# Patient Record
(demographics unavailable — no encounter records)

---

## 2018-02-26 NOTE — OPERATIVE REPORT
Operative Report





- General


Procedure Date: 02/26/18


Planned Procedure: Laparoscopic cholecytectomy, possible open cholecystectomy, 

possible intrao


Pre-Op Diagnosis: Unremitting biliary colic - early cholecystitis


Procedure Performed: 


Laparoscopic cholecystitis


Post Op Diagnosis: Same





- Procedure Note


Primary Surgeon: David العلي MD


Anesthesia Provider: David Bauman MD


Anesthesia Technique: General ET tube


IV Fluids (mL): 600


Estimated Blood Loss (mL): 20


Complications: 


None.





- Other


Other Information/Narrative: 


OPERATIVE DESCRIPTION/REPORT:





After verbal and written informed consent was obtained detailing the risks of 

infection, bleeding with all of its risks including transfusion, common bile 

duct injury, and death the patient was brought to the operative suite and 

placed in the supine position on the operating room table.  Monitoring devices 

were applied along with TEDs and pneumatic compressive stockings.  Care was 

taken to avoid pressure points.  Prophylactic antibiotics were given.  An 

adequate level of general endotracheal anesthesia was established by Dr. David Bauman. The abdomen was then prepped with ChloraPrep and draped in a sterile 

fashion.  A "time in" then confirmed that the patient was identified with 3 

identifiers (name, birth date and medical record number), the history and 

physical was in the chart, the signed consent confirming the procedure was in 

the chart, the patient was in the correct position, the aforementioned 

prophylactic measures were in place or given, we had the correct personnel and 

equipment to complete the procedure and that anesthesia, surgery and nursing 

were given an opportunity to express any concerns.  





The initial incision was at the umbilicus and dissection to the linea alba was 

completed using blunt dissection.  The linea alba was grasped with a Kocher and 

incised.  In a similar manner the peritoneum was grasped and incised using 

Metzenbaum scissors.  In this location, a 12 mm blunt tipped, balloon tipped 

port was placed and the balloon was inflated to keep the port in position.  The 

abdominal cavity was insufflated with carbon dioxide to steady-state pressure 

of 15 mmHg.  Three additional 5 mm ports were placed in standard location for 

laparoscopic cholecystectomy (subxiphoid and 2 right subcostal) under direct 

vision of the 30 degree laparoscope and without incident.   The patient was 

then placed in reverse Trendelenburg position and was rotated slightly to their 

left. 





The gallbladder fundus was grasped with an atraumatic grasper.  Multiple 

adhesions had to be taken down by blunt and sharp dissection along with 

electrocautery.  Eventually, we identified the infundibulum, and this was then 

grasped and retracted inferior and laterally.  Dissection was then begun in the 

angle of Calot.  The cystic duct and (slightly medially and posteriorly) cystic 

artery were clearly identified.  The critical view was obtained.  Two clips 

proximally and one clip distally were used to control both the cystic duct and 

cystic artery.  The clips were carefully placed to avoid occluding the juncture 

with the common bile duct.  Both the cystic duct and then the cystic artery 

were then transected with laparoscopic joselyn.  The gallbladder was then 

removed from its fossa in a retrograde fashion using electrocautery.  With the 

30 degree 5 mm scope in the subxiphoid position, the gallbladder was placed in 

an EndoCatch bag to be extracted through the 12 mm port site.   I irrigated the 

right upper quadrant with a liter of warm sterile saline, and (after a delay 

due to temporary unavailability of suction in the room) the area was aspirated 

dry.  I inspected the gallbladder fossa and there was no bleeding or bile leak.

  Clips on the cystic duct and cystic artery appeared to be secure.   I briefly 

visually explored the abdomen.  There was no other evidence of overt pathology.

  I injected the port sites at the peritoneal, fascial, and skin levels under 

direct vision with 0.25% Marcaine (60 Ml as 0.5% Marcaine ws not available).   

All ports and the EndoCatch containing the gallbladder were removed.  Following 

gallbladder removal, the remaining carbon dioxide was expelled from the abdomen.





The fascia at the umbilicus was reapproximated using a 0 Vicryl suture.  The 

skin at each port site was approximated using a subcuticular 4-0 Monocryl.  The 

surgical count of instruments, needles and sponges was reported as correct 

twice.  Mastisol, Steri-Strips and sterile surgical dressings were applied.  

The patient was then awakened from anesthesia, extubated, and having tolerated 

the procedure well, was transported to the recovery room.  No complications 

were encountered.  A "time out" confirmed the operation performed, the fluids 

given, the estimated blood loss and anesthesia, surgery and nursing were given 

an opportunity to express any concerns.

## 2018-02-26 NOTE — CONSULTATION NOTE
Referring Provider


Name of Referring Provider:: David Larsen MD


Consult Date: 02/26/18





Chief Complaint





- Chief Complaint


Chief Complaint: Postprandial RUQ pain with nausea and vomiting





History of Present Illness





- Admitted From


Admitted From:: ED





- History Obtained From


Records Reviewed: Yes


History obtained from: Patient


Exam Limitations: The examination was done in the montiel in ED.





- History of Present Illness


HPI Comment/Other: 


This very pleasant 21-year-old female had the abrupt onset of epigastric and 

right upper quadrant pain last night after eating a meal consisting of 

quesadillas and nodules.  There was some alcohol that was consumed as well.  

The patient is postpartum.  There is no hematemesis melena or hematochezia.  

The pain was described as right-sided sharp up underneath her right ribs.  She 

had not had this pain previously.  She does complain of some occasional GERD.





History





- Past Medical History


GI: reports: GERD


MRSA Hx?: No





- POLST


Patient has POLST: No





Meds/Allgy





- Home Medications


Home Medications: 


 Ambulatory Orders











 Medication  Instructions  Recorded  Confirmed


 


Pnv No.122/Iron/Folic Acid 1 tab PO DAILY 02/26/18 02/26/18





[Prenatal Multi Tablet]   














- Allergies


Allergies/Adverse Reactions: 


 Allergies











Allergy/AdvReac Type Severity Reaction Status Date / Time


 


No Known Drug Allergies Allergy   Verified 12/27/15 17:12














Review of Systems





- Constitutional


Constitutional: denies: Fatigue, Fever, Chills, Malaise





- Eyes


Eyes: denies: Pain





- Ears, Nose & Throat


Ears, Nose & Throat: denies: Ear pain





- Cardiovascular


Cariovascular: denies: Irregular heart rate, Palpitations, Chest pain





- Respiratory


Respiratory: denies: Cough





- Gastrointestinal


Gastrointestinal: reports: Abdominal pain, Nausea, Vomiting.  denies: Black 

stools, Bloody stools





- Genitourinary


Genitourinary: denies: Dysuria





- Integumentary


Integumentary: denies: Rash





- Neurological


Neurological: denies: General weakness, Focal weakness





- Psychiatric


Psychiatric: denies: Depression





Exam





- Vital Signs


Reviewed Vital Signs: Yes


Vital Signs: 





 Vital Signs x48h











  Pulse Resp BP Pulse Ox


 


 02/26/18 14:50  74  14  106/72  100


 


 02/26/18 13:59  74  16  101/67  100


 


 02/26/18 12:30  68  16  100/64  97


 


 02/26/18 09:24  56 L  18  149/101 H  100














- Physical Exam


General Appearance: positive: No acute distress


Eyes Bilateral: positive: Normal inspection


ENT: positive: No signs of dehydration, Dry mucous membranes


Neck: positive: Trachea midline


Respiratory: positive: Chest non-tender, No respiratory distress, Breath sounds 

nml


Cardiovascular: positive: Regular rate & rhythm


Abdomen: positive: Nml bowel sounds, Tenderness (In RUQ.)


Skin: positive: Color nml


Neurologic/Psychiatric: positive: Oriented x3, Mood/affect nml





Conclusion/Plan





- Plan


Plan: 


Laparoscopic cholecystectomy, possible open cholecystectomy, possible 

intraoperative cholangiogram, possible common bile duct exploration.  The 

indications, procedure, alternatives including no surgery, ingestion of Actigall

, possible risks including infection (deep or superficial), bleeding requiring 

transfusion (with all of its risks), common bile duct injury requring repair 

and additional surgery, and death were fully explained to the patient and all 

questions answered.  I also explained the pathophysiology.  I explained that 

following the surgery I did not want her lifting anything over 15 pounds for 6 

weeks to allow for optimal healing and to decrease the likelihood that a hernia 

would occur.  All questions were fully answered.  Verbal and written consent 

was obtained.  The patient, in preparation for surgery will be nothing by mouth

, and receive 2 gm of Cephalexin with induction.  I asked her to contact me 

with any surgical questions and her concerns and she stated that she would.  I 

asked her to let me know if there is any way we can make her stay at 

[facility] more comfortable and she stated that she would let 

me know.





The plan is to do this operation as an outpatient procedure and to discharge 

her home following the procedure.





45 minutes of face-to-face time spent with the patient, over 80% in discussion 

and coordination of her care





- Lab Results


Lab results reviewed: Yes


Fish Bones: 


 02/26/18 08:16





 02/26/18 08:16





- Diagnostic Imaging Results


Diagnostic Imaging Results: positive: Final report reviewed

## 2018-02-26 NOTE — MRI REPORT
EXAM:

MR ABDOMEN WITHOUT CONTRAST

(MR CHOLANGIOPANCREATOGRAPHY)

 

EXAM DATE: 2/26/2018 11:57 AM.

 

CLINICAL HISTORY: Cholecystitis, elevated bili, enlarged CD.

 

COMPARISON: Right upper quadrant abdominal ultrasound earlier same day.

 

TECHNIQUE: Multiplanar breath-hold T1 and T2 sequences obtained through the abdomen on an MR scanner.
 Dedicated 2D and 3D MRCP sequences obtained through the biliary and pancreatic ducts. No intravenous
 contrast given.

 

FINDINGS:

Lung Bases: The lung bases are clear.

 

Liver: The liver has normal size, morphology and signal. No evidence of mass. The intrahepatic bile d
ucts appear normal.

 

CBD: The extrahepatic ducts appear normal. The CBD is 7 mm in diameter. 10 mm common hepatic duct tiara
meter.

 

Gallbladder: Distended gallbladder contains multiple small dependent stones. No significant wall thic
kening.

 

Pancreas: The pancreas appears normal with no mass. The pancreatic duct measures 1 mm in diameter and
 appears normal with no stone or stricture.

 

Spleen: The spleen appears normal.

 

Kidneys and Adrenals: The kidneys appear normal with no mass or hydronephrosis. The adrenals appear n
ormal.

 

Bowel: The small bowel and colon appear normal with no inflammation or obstruction.

 

Retroperitoneum: The retroperitoneal structures appear normal with no mass or lymphadenopathy.

 

IMPRESSION: 

1. No choledocholithiasis.

2. Mildly prominent 10 mm common hepatic duct diameter.

3. Distended gallbladder contains multiple small dependent stones. Positive sonographic Pavon's sign
 on ultrasound earlier today raises concern for acute cholecystitis.

 

[facility]

Referring Provider Line: [phone]

 

SITE ID: [id number]

## 2018-02-26 NOTE — ED PHYSICIAN DOCUMENTATION
PD HPI ABD PAIN





- Stated complaint


Stated Complaint: ABD PX





- Chief complaint


Chief Complaint: Abd Pain





- History obtained from


History obtained from: Patient





- History of Present Illness


Timing - onset: Enter  time (2200), Last night


Timing - duration: Hours


Timing - details: Gradual onset, Still present


Quality: Sharp, Pain


Location: Epigastric


Improved by: Laying still


Worsened by: Moving, Breathing, Position, Palpation


Associated symptoms: Nausea, Vomiting


Similar symptoms before: Has not had sx before


Recently seen: Not recently seen





- Additional information


Additional information: 





21-year-old female has developed acute epigastric abdominal pain at about 11 PM 

last night this is kept her awake all night she has had some vomiting she has 

severe epigastric pain.She denies any use of ibuprofen or Aleve she has had 

some alcohol yesterday with her food and she remembers last thing she ate was a 

quesadilla and some nachos. 





Review of Systems


Constitutional: denies: Fever


Eyes: denies: Decreased vision


Ears: denies: Ear pain


Nose: denies: Congestion


Throat: denies: Sore throat


Cardiac: denies: Chest pain / pressure, Palpitations


Respiratory: denies: Dyspnea, Cough


GI: reports: Abdominal Pain, Nausea, Vomiting


: denies: Dysuria, Frequency


Skin: denies: Rash


Musculoskeletal: denies: Neck pain, Back pain, Extremity pain


Neurologic: denies: Generalized weakness, Focal weakness, Numbness





PD PAST MEDICAL HISTORY





- Past Medical History


GI: GERD





- Past Surgical History


Past Surgical History: Yes





- Present Medications


Home Medications: 


 Ambulatory Orders











 Medication  Instructions  Recorded  Confirmed


 


Pnv No.122/Iron/Folic Acid 1 tab PO DAILY 02/26/18 02/26/18





[Prenatal Multi Tablet]   














- Allergies


Allergies/Adverse Reactions: 


 Allergies











Allergy/AdvReac Type Severity Reaction Status Date / Time


 


No Known Drug Allergies Allergy   Verified 12/27/15 17:12














- Social History


Does the pt smoke?: No


Smoking Status: Never smoker


Does the pt drink ETOH?: Yes


Does the pt have substance abuse?: No





- Immunizations


Immunizations are current?: Yes





- POLST


Patient has POLST: No





PD ED PE NORMAL





- Vitals


Vital signs reviewed: Yes (Mild diastolic hypertension)





- General


General: Alert and oriented X 3, Well developed/nourished, Other (21-year-old 

female moaning in pain)





- HEENT


HEENT: Atraumatic, PERRL, EOMI





- Neck


Neck: Supple, no meningeal sign, No bony TTP





- Cardiac


Cardiac: RRR, No murmur





- Respiratory


Respiratory: No respiratory distress, Clear bilaterally





- Abdomen


Abdomen: Soft, Other (Specific right upper quadrant tenderness to palpation 

with the rest of respiration`)





- Back


Back: No CVA TTP, No spinal TTP





- Derm


Derm: Normal color, Warm and dry, No rash





- Extremities


Extremities: No deformity, No edema





- Neuro


Neuro: Alert and oriented X 3, No motor deficit, No sensory deficit, Normal 

speech


Eye Opening: Spontaneous


Motor: Obeys Commands


Verbal: Oriented


GCS Score: 15





- Psych


Psych: Normal mood, Normal affect





Results





- Vitals


Vitals: 


 Vital Signs - 24 hr











  02/26/18 02/26/18 02/26/18





  07:52 09:24 12:30


 


Temperature 36.4 C L  


 


Heart Rate 66 56 L 68


 


Respiratory 20 18 16





Rate   


 


Blood Pressure 135/82 H 149/101 H 100/64


 


O2 Saturation 100 100 97














  02/26/18





  13:59


 


Temperature 


 


Heart Rate 74


 


Respiratory 16





Rate 


 


Blood Pressure 101/67


 


O2 Saturation 100








 Oxygen











O2 Source                      Room air

















- Labs


Labs: 


 Laboratory Tests











  02/26/18 02/26/18 02/26/18





  08:16 08:16 10:30


 


WBC  10.5  


 


RBC  5.76 H  


 


Hgb  15.5  


 


Hct  46.2  


 


MCV  80.2 L  


 


MCH  27.0  


 


MCHC  33.6  


 


RDW  14.8  


 


Plt Count  250  


 


MPV  8.2  


 


Neut #  8.2 H  


 


Lymph #  1.7  


 


Mono #  0.5  


 


Eos #  0.0  


 


Baso #  0.0  


 


Absolute Nucleated RBC  0.01  


 


Nucleated RBC %  0.0  


 


Sodium   137 


 


Potassium   3.5 


 


Chloride   105 


 


Carbon Dioxide   22 


 


Anion Gap   10.0 


 


BUN   13 


 


Creatinine   0.6 


 


Estimated GFR (MDRD)   126 


 


Glucose   140 H 


 


Calcium   9.2 


 


Total Bilirubin   1.6 H 


 


AST   326 H 


 


ALT   195 H 


 


Alkaline Phosphatase   131 H 


 


Total Protein   8.5 H 


 


Albumin   4.4 


 


Globulin   4.1 


 


Albumin/Globulin Ratio   1.1 


 


Lipase   14 L 


 


Urine Color    DARK YELLOW


 


Urine Clarity    CLEAR


 


Urine pH    7.0


 


Ur Specific Gravity    1.025


 


Urine Protein    TRACE


 


Urine Glucose (UA)    NEGATIVE


 


Urine Ketones    NEGATIVE


 


Urine Occult Blood    NEGATIVE


 


Urine Nitrite    NEGATIVE


 


Urine Bilirubin    NEGATIVE


 


Urine Urobilinogen    0.2 (NORMAL)


 


Ur Leukocyte Esterase    NEGATIVE


 


Ur Microscopic Review    NOT INDICATED


 


Urine Culture Comments    NOT INDICATED


 


Urine HCG, Qual    NEGATIVE














- Rads (name of study)


  ** ultrasound lmt


Radiology: Prelim report reviewed (Impression: Positive stones and localized 

tenderness consistent with calculus cholecystitis.  Common bile duct dilated 11 

mm.), EMP read indepedently, See rad report





  ** MRCP


Radiology: Prelim report reviewed (Impression: 1.  No choledocholithiasis.  

Mildly prominent 10 mm common hepatic duct diameter 2. distended gallbladder 

contains multiple small dependent stones 3.positive sonographic Pavon sign on 

ultrasound earlier today raises concern for acute cholecystitis.), EMP read 

indepedently, See rad report





Procedures





- Bedside sono


Bedside sono by EMP: 





With use of bedside ultrasound the gallbladder is imaged it is distended there 

is a stone present in the fundus of the gallbladder I am not able to adequately 

image the neck of the gallbladder the gallbladder wall is not thickened and 

there is no pericholecystic fluid.





- IVC sono (time)


  ** 0830


Bedside IVC sono: IVC measures (cm) (1.45), IVC collapsed c insp (cm) (complete)

, Dehydration (est 500ml deficit)





PD MEDICAL DECISION MAKING





- ED course


Complexity details: reviewed old records, reviewed results, re-evaluated patient

, considered differential, d/w patient, d/w family


ED course: 





21-year-old female with acute cholelithiasis and cholecystitis has a generous 

common bile duct and on MRCP no evidence of retained stone.  She has 

improvement in her pain with intravenous Toradol but continues to have pain.  

The surgeon Dr. Hargrove is consulted in the case





Departure





- Departure


Disposition: ED Transfer to [facility]


Clinical Impression: 


 Cholecystitis





Cholelithiasis


Qualifiers:


 Cholelithiasis location: gallbladder Cholecystitis presence: with 

cholecystitis Cholecystitis acuity: acute Biliary obstruction: without biliary 

obstruction Qualified Code(s): K80.00 - Calculus of gallbladder with acute 

cholecystitis without obstruction

## 2018-02-26 NOTE — ULTRASOUND REPORT
RIGHT UPPER QUADRANT ULTRASOUND:  02/26/2018

 

CLINICAL INDICATION:  Right upper quadrant pain.

 

TECHNIQUE:  Real-time scanning was performed with representative static images obtained.

 

FINDINGS:  The liver measures 18.9 cm.  Hepatic echogenicity is normal.  No intrahepatic 

biliary dilatation or focal parenchymal lesion is appreciated.  The common bile duct is 

dilated, measuring 11 mm.  The gallbladder demonstrates multiple mobile stones and localized 

tenderness, compatible with acute cholecystitis.  The right kidney measures 13 cm, and 

demonstrates no hydronephrosis.  No free fluid is present.

 

IMPRESSION:  CHOLELITHIASIS, WITH LOCALIZED TENDERNESS AND BILIARY DILATATION, COMPATIBLE WITH 

ACUTE CHOLECYSTITIS AND POSSIBLE CHOLEDOCHOLITHIASIS.

 

 

DD: 02/26/2018 09:16

TD: 02/26/2018 09:33

Job #: [id number]
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
## 2018-09-17 NOTE — ED PHYSICIAN DOCUMENTATION
PD HPI FOCAL NEURO





- Stated complaint


Stated Complaint: BLOOD SUGAR





- Chief complaint


Chief Complaint: Neuro





- History obtained from


History obtained from: Patient





- History of Present Illness


Timing - onset: Today (She was at work today around 4:00 and developed brief 

tunnel vision followed by spinning dizziness.  She does have some left ear 

fullness and chronically decreased hearing.  No acute respiratory complaints 

otherwise.  Still feeling a bit dizzy but generally better.  She was worried 

specifically about low blood sugar but was checked in triage and normal.)





Review of Systems


Constitutional: denies: Fever, Chills


Ears: reports: Loss of hearing, Tinnitus/ringing.  denies: Ear pain, 

Drainage/discharge


Nose: denies: Rhinorrhea / runny nose, Congestion


Throat: denies: Sore throat





PD PAST MEDICAL HISTORY





- Past Medical History


GI: GERD





- Past Surgical History


Past Surgical History: Yes





- Present Medications


Home Medications: 


                                Ambulatory Orders











 Medication  Instructions  Recorded  Confirmed


 


Meclizine [Antivert] 25 mg PO Q6H PRN #15 tablet 09/17/18 


 


Nitrofurantoin Monohyd/M-Cryst 100 mg PO BID #10 capsule 09/17/18 





[Macrobid 100 mg Capsule]   














- Allergies


Allergies/Adverse Reactions: 


                                    Allergies











Allergy/AdvReac Type Severity Reaction Status Date / Time


 


Latex, Natural Rubber Allergy  Rash Verified 09/17/18 18:26














- Social History


Does the pt smoke?: No


Smoking Status: Never smoker


Does the pt drink ETOH?: Yes


Does the pt have substance abuse?: No





- Immunizations


Immunizations are current?: Yes





- POLST


Patient has POLST: No





PD ED PE NORMAL





- Vitals


Vital signs reviewed: Yes





- General


General: Alert and oriented X 3, No acute distress





- HEENT


HEENT: PERRL, EOMI, Ears normal, Pharynx benign





- Neck


Neck: Supple, no meningeal sign, No bony TTP





- Cardiac


Cardiac: RRR, No murmur





- Respiratory


Respiratory: No respiratory distress, Clear bilaterally





- Abdomen


Abdomen: Non tender





- Neuro


Neuro: Alert and oriented X 3, CNs 2-12 intact


Eye Opening: Spontaneous


Motor: Obeys Commands


Verbal: Oriented


GCS Score: 15





- Psych


Psych: Normal mood, Normal affect





Results





- Vitals


Vitals: 


                               Vital Signs - 24 hr











  09/17/18





  18:07


 


Temperature 37 C


 


Heart Rate 77


 


Respiratory 16





Rate 


 


Blood Pressure 109/71


 


O2 Saturation 100








                                     Oxygen











O2 Source                      Room air

















- Labs


Labs: 


                                Laboratory Tests











  09/17/18 09/17/18





  18:07 18:23


 


POC Whole Bld Glucose   91


 


Urine Color  YELLOW 


 


Urine Clarity  CLOUDY 


 


Urine pH  6.0 


 


Ur Specific Gravity  1.015 


 


Urine Protein  NEGATIVE 


 


Urine Glucose (UA)  NEGATIVE 


 


Urine Ketones  NEGATIVE 


 


Urine Occult Blood  NEGATIVE 


 


Urine Nitrite  NEGATIVE 


 


Urine Bilirubin  NEGATIVE 


 


Urine Urobilinogen  0.2 (NORMAL) 


 


Ur Leukocyte Esterase  SMALL H 


 


Urine RBC  0-5 


 


Urine WBC  11-25 H 


 


Ur Squamous Epith Cells  MANY Squamous H 


 


Urine Bacteria  Many H 


 


Ur Microscopic Review  INDICATED 


 


Urine Culture Comments  NOT INDICATED 


 


Urine HCG, Qual  NEGATIVE 














PD MEDICAL DECISION MAKING





- ED course


ED course: 





She has nonspecific dizziness which is likely mostly vertiginous based on 

history but no physical findings and a negative stroke scale.





- Sepsis Event


Vital Signs: 


                               Vital Signs - 24 hr











  09/17/18





  18:07


 


Temperature 37 C


 


Heart Rate 77


 


Respiratory 16





Rate 


 


Blood Pressure 109/71


 


O2 Saturation 100








                                     Oxygen











O2 Source                      Room air

















Departure





- Departure


Disposition: 01 Home, Self Care


Clinical Impression: 


 Cystitis, Dizziness





Condition: Good


Record reviewed to determine appropriate education?: Yes


Instructions:  ED UTI Cystitis Female, ED Dizziness [facility]


Prescriptions: 


Meclizine [Antivert] 25 mg PO Q6H PRN #15 tablet


 PRN Reason: Vertigo


Nitrofurantoin Monohyd/M-Cryst [Macrobid 100 mg Capsule] 100 mg PO BID #10 

capsule


Comments: 


Call your doctor to arrange a follow-up appointment, make the next available 

appointment.  In the interim, return anytime if worse or if new symptoms 

develop.

## 2018-11-22 NOTE — ED PHYSICIAN DOCUMENTATION
PD HPI NVD





- Stated complaint


Stated Complaint: VOMITING





- Chief complaint


Chief Complaint: Abd Pain





- History obtained from


History obtained from: Patient





- History of Present Illness


Timing - onset: How many weeks ago (1.5)


Timing - duration: Weeks (1.5)


Timing - details: Gradual onset, Intermittant


Pain level max: 3


Pain level now: 0


Associated symptoms: Abdominal pain.  No: Chest pain, Hematemesis, Melena, 

Hematochezia, Dizzy, Near syncope / syncope, Loss of appetite, Dysuria, 

Hematuria, Vaginal bleeding, Vaginal dc


Contributing factors: No: Sick contact, Bad food, Travel, Recent antibiotics


Improved by: Other (Nothing)


Worsened by: Eating


Similar symptoms before: Has not had sx before


Recently seen: Not recently seen





- Additonal information


Additional information: 





22-year-old female  history of cholecystectomy here with complaint of nausea

And vomiting the past 1-1/2 weeks.  Patient stated she usually vomits every time

she eats at least 3 times a day. States she has low abdominal cramping just 

before she vomits. She also stated she has intermittent chronic diarrhea since 

her cholecystectomy in 2018.  Denies fever but has chills.  Patient 

denies any trauma, travel or sick contact.Denies pregnancy her LMP was  which was regular.  She is .





Review of Systems


Ten Systems: 10 systems reviewed and negative


Constitutional: reports: Chills.  denies: Fever, Myalgias


Nose: denies: Congestion


Throat: denies: Sore throat


Cardiac: denies: Chest pain / pressure


Respiratory: denies: Dyspnea, Cough


GI: reports: Abdominal Pain, Nausea, Vomiting, Diarrhea.  denies: Constipation, 

Hematemesis, Bloody / black stool


: denies: Dysuria, Frequency, Discharge


Neurologic: denies: Generalized weakness





PD PAST MEDICAL HISTORY





- Past Medical History


GI: GERD





- Past Surgical History


Past Surgical History: Yes


General: Cholecystectomy





- Present Medications


Home Medications: 


                                Ambulatory Orders











 Medication  Instructions  Recorded  Confirmed


 


Meclizine [Antivert] 25 mg PO Q6H PRN #15 tablet 18 


 


Nitrofurantoin Monohyd/M-Cryst 100 mg PO BID #10 capsule 18 





[Macrobid 100 mg Capsule]   














- Allergies


Allergies/Adverse Reactions: 


                                    Allergies











Allergy/AdvReac Type Severity Reaction Status Date / Time


 


Latex, Natural Rubber Allergy  Rash Verified 18 19:58














- Social History


Does the pt smoke?: No


Smoking Status: Never smoker


Does the pt drink ETOH?: Yes


Does the pt have substance abuse?: No





- Immunizations


Immunizations are current?: Yes





- POLST


Patient has POLST: No





PD ED PE NORMAL





- Vitals


Vital signs reviewed: Yes





- General


General: Alert and oriented X 3, No acute distress, Well developed/nourished





- HEENT


HEENT: EOMI, Moist mucous membranes, Pharynx benign





- Neck


Neck: Supple, no meningeal sign





- Cardiac


Cardiac: RRR, No murmur





- Respiratory


Respiratory: No respiratory distress, Clear bilaterally





- Abdomen


Abdomen: Normal bowel sounds, Soft, Non distended, Other (Mild lower abdominal 

pain and left lower quadrant pain To deep palpation without guarding, rebound no

rigidity.)





- Back


Back: No CVA TTP





- Derm


Derm: Normal color, Warm and dry





- Extremities


Extremities: No deformity





- Neuro


Neuro: Alert and oriented X 3





- Psych


Psych: Normal mood, Normal affect





Results





- Vitals


Vitals: 


                               Vital Signs - 24 hr











  18





  19:59 20:15


 


Temperature 36.9 C 


 


Heart Rate 89 98


 


Respiratory 18 16





Rate  


 


Blood Pressure 125/75 121/84 H


 


O2 Saturation 100 100








                                     Oxygen











O2 Source                      Room air

















- Labs


Labs: 


                                Laboratory Tests











  18





  20:33 20:33 20:33


 


WBC  12.0 H  


 


RBC  5.05  


 


Hgb  13.5  


 


Hct  41.1  


 


MCV  81.5  


 


MCH  26.8 L  


 


MCHC  32.9  


 


RDW  14.9  


 


Plt Count  298  


 


MPV  8.0  


 


Neut # (Auto)  7.0 H  


 


Lymph # (Auto)  3.7 H  


 


Mono # (Auto)  1.0  


 


Eos # (Auto)  0.2  


 


Baso # (Auto)  0.0  


 


Absolute Nucleated RBC  0.01  


 


Nucleated RBC %  0.0  


 


Sodium   139 


 


Potassium   3.3 L 


 


Chloride   106 


 


Carbon Dioxide   24 


 


Anion Gap   9.0 


 


BUN   11 


 


Creatinine   0.6 


 


Estimated GFR (MDRD)   125 


 


Glucose   97 


 


Calcium   9.0 


 


Total Bilirubin   0.4 


 


AST   27 


 


ALT   28 


 


Alkaline Phosphatase   87 


 


Total Protein   7.9 


 


Albumin   3.8 


 


Globulin   4.1 


 


Albumin/Globulin Ratio   0.9 L 


 


Lipase   25 


 


HCG, Quant    1198.50


 


Urine Color   


 


Urine Clarity   


 


Urine pH   


 


Ur Specific Gravity   


 


Urine Protein   


 


Urine Glucose (UA)   


 


Urine Ketones   


 


Urine Occult Blood   


 


Urine Nitrite   


 


Urine Bilirubin   


 


Urine Urobilinogen   


 


Ur Leukocyte Esterase   


 


Ur Microscopic Review   


 


Urine Culture Comments   


 


Urine HCG, Qual   














  18





  20:54


 


WBC 


 


RBC 


 


Hgb 


 


Hct 


 


MCV 


 


MCH 


 


MCHC 


 


RDW 


 


Plt Count 


 


MPV 


 


Neut # (Auto) 


 


Lymph # (Auto) 


 


Mono # (Auto) 


 


Eos # (Auto) 


 


Baso # (Auto) 


 


Absolute Nucleated RBC 


 


Nucleated RBC % 


 


Sodium 


 


Potassium 


 


Chloride 


 


Carbon Dioxide 


 


Anion Gap 


 


BUN 


 


Creatinine 


 


Estimated GFR (MDRD) 


 


Glucose 


 


Calcium 


 


Total Bilirubin 


 


AST 


 


ALT 


 


Alkaline Phosphatase 


 


Total Protein 


 


Albumin 


 


Globulin 


 


Albumin/Globulin Ratio 


 


Lipase 


 


HCG, Quant 


 


Urine Color  YELLOW


 


Urine Clarity  CLEAR


 


Urine pH  6.0


 


Ur Specific Gravity  <=1.005


 


Urine Protein  NEGATIVE


 


Urine Glucose (UA)  NEGATIVE


 


Urine Ketones  NEGATIVE


 


Urine Occult Blood  NEGATIVE


 


Urine Nitrite  NEGATIVE


 


Urine Bilirubin  NEGATIVE


 


Urine Urobilinogen  0.2 (NORMAL)


 


Ur Leukocyte Esterase  NEGATIVE


 


Ur Microscopic Review  NOT INDICATED


 


Urine Culture Comments  NOT INDICATED


 


Urine HCG, Qual  POSITIVE














PD MEDICAL DECISION MAKING





- ED course


Complexity details: reviewed results, re-evaluated patient, considered 

differential (Gastroenteritis, obstruction, diverticulitis, UTI, pregnancy, 

colitis), d/w patient


ED course: 





 patient informed that her urine pregnancy was positive.  She confirmed that

her last menstrual cycle was regular and that she was using condoms.  We will do

a quantitative hCG to confirm the pregnancy or not.   patient here with her 

girlfriend in no acute distress.  Informed that her hCG quantitative was 1198 so

she is pregnant.  Patient stated although she is having problems with her 

marriage and her first baby just turned 1 she will see her midwife for prenatal 

care.  Patient does not want any medicationFor nausea because she does not like 

taking medications.  She also stated she had a glass of wine yesterday.But now 

that she is pregnant she is aware that she cannot drink any alcohol.  We 

discussed foods that are high in potassiumFor her blood level of 3.3.





Departure





- Departure


Disposition: 01 Home, Self Care


Clinical Impression: 


 Hypokalemia due to loss of potassium





Vomiting


Qualifiers:


 Vomiting type: unspecified Vomiting Intractability: non-intractable Nausea 

presence: with nausea Qualified Code(s): R11.2 - Nausea with vomiting, 

unspecified





Pregnancy


Qualifiers:


 Weeks of gestation: less than 8 weeks Qualified Code(s): Z3A.01 - Less than 8 

weeks gestation of pregnancy





Condition: Stable


Instructions:  Diet High Potassium Dc, Diet Clear Liquid Dc, ED Prenatal Care, 

ED Preg Established Normal Sxs


Comments: 


Follow-up with your midwife for prenatal care Next week.  Clear liquids today.  

Advance to brat diet (bananas, rice, applesauce, toast or crackers).  Drink and 

eat in small amounts but frequently.  Eat foods that are high in potassium from 

the list that was given to you.  If worse return to the emergency room.

## 2019-09-30 NOTE — ULTRASOUND REPORT
Reason:  POSITIVE PREGNANCY TEST

Procedure Date:  09/29/2019   

Accession Number:  [id number] / [id number]                    

Procedure:  US  - OB First Trimester CPT Code:  

 

FULL RESULT:

 

 

EXAM:

FIRST TRIMESTER OBSTETRIC ULTRASOUND

(Less than 11 weeks)

 

EXAM DATE: 9/29/2019 10:52 AM.

 

CLINICAL HISTORY: Positive pregnancy test.

 

LMP: Unknown.

 

COMPARISONS: None.

 

TECHNIQUE: Transabdominal and transvaginal ultrasound examination with 

static image documentation.

 

CLINICAL DATES:

EGA 7 weeks 3 days with BLANCA 05/14/2020 based on LMP.

 

ASSESSMENT:

Gestational Sac: Single intrauterine.  Mean gestational sac diameter: 38 

mm = 9 weeks 1 day.

Embryo: CRL (crown-rump length) 18 mm = 8 weeks 2 days.

Cardiac activity: 161 beats per minute.

Yolk sac: 4 mm.

Amniotic fluid: Present.

Early placenta: Not visible at this gestational age.

Other: No perigestational fluid collection demonstrated.

 

MATERNAL STRUCTURES:

Uterus: Anteverted. Unremarkable.

Cervix: Closed.

Right Ovary/Adnexa: The ovary measures 3.4 x 1.6 x 1.6 cm, volume 4.5 cc. 

Subcentimeter benign-appearing cyst.

Left Ovary/Adnexa: The ovary measures 3.4 x 1.8 x 2.3 cm, volume 7.4 cc.  

18 mm maximal diameter corpus luteal cyst.

Free Fluid: Trace.

 

Other: None.

IMPRESSION:

1. Single viable intrauterine pregnancy at EGA 8 weeks 2 days with BLANCA 

05/08/2020 based on crown-rump length, which is concordant with clinical 

dates.

2. Assigned dating is BLANCA 05/08/2020 based on current ultrasound.

 

[facility]

## 2019-12-16 NOTE — ULTRASOUND REPORT
Reason:   SCREENING

Procedure Date:  2019   

Accession Number:  [id number] / [id number]                    

Procedure:  US  - OB Detailed Fetal Eval CPT Code:  

 

***Final Report***

 

 

FULL RESULT:

 

 

EXAM:

COMPLETE OBSTETRICAL ULTRASOUND

 

EXAM DATE: 2019 07:24 AM.

 

CLINICAL HISTORY: Fetal anatomic survey.

 

COMPARISON: OB FIRST TRIMESTER 2019 9:48 AM.

 

TECHNIQUE: Real-time sonographic evaluation of the fetus performed by the 

sonographer. Multiple representative static images were saved for review.

 

DATING:

Established EGA 19 weeks 3 days with BLANCA 2020 based on first 

ultrasound of 2019.

 

EGA 19 weeks 2 days with BLANCA 2020 based on the current ultrasound.

 

GENERAL EVALUATION

Sanderson pregnancy.

Cardiac activity: 150 bpm.

Fetal movement: Present

Presentation: Breech

Placenta: Anterior position. No evidence for previa.

Umbilical cord: 3 vessel cord. Eccentric placental cord origin.

Amniotic fluid: Subjectively normal. MVP 5 cm.

 

FETAL BIOMETRY

Bi-Parietal Diameter (BPD): 4.3 cm, 18 weeks 6 days

Head Circumference (HC):   16.6 cm, 19 weeks 2 days

Abdominal Circumference (AC): 14.3 cm, 19 weeks 4 days

Femur Length (FL): 3.1 cm, 19 weeks 4 days

 

Estimated Fetal Weight: 297 g, 51st percentile for 19 weeks 3 days.

 

FETAL ANATOMY

The intracranial structures, profile, spine, stomach, abdominal wall and 

cord insertion, diaphragm, kidneys, bladder, and lower extremities were 

visualized and demonstrate no abnormality. The heart and outflow tracts, 

nose and lips, and real-time open hands are not seen today in follow-up 

imaging in 2-3 weeks is suggested.

 

MATERNAL STRUCTURES

Uterus: Unremarkable.

Cervix: Long and closed. Transabdominal length 4.2 cm.

Right ovary/adnexa: Unremarkable.

Left ovary/adnexa: Unremarkable.

Free fluid: None.

IMPRESSION:

1. Sanderson live intrauterine pregnancy with gestational age 19 weeks 3 

days based on first ultrasound.

2. Estimated fetal weight is within expected limits for assigned dating.

3. Limited fetal anatomic survey.  Follow-up ultrasound in 2-3 weeks for 

assessment of the heart and outflow tracts, nose and lips, and real-time 

open hands is suggested.

 

[facility]

## 2020-01-07 NOTE — ULTRASOUND REPORT
Reason:   SCREENING

Procedure Date:  2020   

Accession Number:  [id number] / [id number]                    

Procedure:  US  - OB F/U or Repeat CPT Code:  

 

***Final Report***

 

 

FULL RESULT:

 

 

EXAM:

FOLLOW-UP OBSTETRICAL ULTRASOUND

 

EXAM DATE: 2020 01:52 PM.

 

CLINICAL HISTORY: Follow-up incomplete survey. 4 chamber, RVOT, LVOT, 

nose lips and open hands

 

COMPARISON: 2019.

 

TECHNIQUE: Real-time sonographic evaluation of the fetus performed by the 

sonographer.  Multiple representative static images were saved for review.

 

DATING:

Established EGA 22 weeks 3 days with BLANCA 2020 based on  assigned 

dating.

 

GENERAL EVALUATION

Sanderson pregnancy.

Cardiac activity: 153 bpm.

Fetal movement: Visualized.

Presentation: Variable

Placenta: Anterior position.

Amniotic fluid: Normal. TARSHA 18.4 cm. MVP 6.2 cm.

 

FETAL ANATOMY

The four-chamber heart, LVOT visualized. RVOT not visualized. Open hands 

visualized in real time. Fetal profile seen. Nose lips not visualized.

 

 

IMPRESSION:

1. Sanderson live intrauterine pregnancy with gestational age 22 weeks 3 

days based on   assigned dating.

2. Follow-up previous incomplete survey shows normal four-chamber heart, 

LVOT, nasal bone, open hands.

3. Anatomy not visualized RVOT, nose lips. Additional follow-up scan

[facility]

## 2020-01-21 NOTE — ULTRASOUND REPORT
Reason:   SCREENING, COMPLETION FAS

Procedure Date:  2020   

Accession Number:  [id number] / [id number]                    

Procedure:  US  - OB F/U or Repeat CPT Code:  

 

***Final Report***

 

 

FULL RESULT:

 

 

EXAM:

FOLLOW-UP OBSTETRICAL ULTRASOUND

 

EXAM DATE: 2020 01:02 PM.

 

CLINICAL HISTORY:  SCREENING, COMPLETION FAS.

 

COMPARISON: OB F/U OR REPEAT 2020 12:19 PM

OB FIRST TRIMESTER 2019 9:48 AM.

 

TECHNIQUE: Real-time sonographic evaluation of the fetus performed by the 

sonographer.   Multiple representative static images were saved for 

review.

 

DATING:

Established EGA 24 weeks 3 days with BLANCA 2020 based on physician 

stated.

EGA 24 weeks 3 days with BLANCA 2020 based on  first ultrasound .

 

 

GENERAL EVALUATION

Sanderson pregnancy.

Cardiac activity: 152 bpm.

Fetal movement: Visualized.

Presentation: Cephalic.

Placenta: Anterior position.

Amniotic fluid: Normal TARSHA 15.5 cm. MVP 4.9 cm.

 

FETAL ANATOMY

Cardiac: 4 chamber heart, LVOT, RVOT visualized and normal. No 

pericardial effusion. Nose lips, profile normal.

 

IMPRESSION:

1. Sanderson live intrauterine pregnancy with gestational age 24 weeks 3 

days based on physician assigned dating.

2. Follow-up shows normal fetal cardiac exam, nose lips, profile.

 

[facility]

## 2020-04-01 NOTE — PROVIDER PROGRESS NOTE
- HPI


Chief Complaint: Pain, non-labor


Current Pregnancy: 


                                                               Current Pregnancy





EDU                              20


Gestation                        34 Weeks and 5 Days


                          5


Para                             1





Vital Signs











Temperature  36.8 C   20 17:27


 


Heart Rate  94   20 17:27


 


Respiratory Rate  17   20 17:27


 


Blood Pressure  107/72   20 17:27


 


O2 Saturation  99   20 17:27




















Temperature  36.8 C   20 17:27


 


Heart Rate  94   20 17:27


 


Respiratory Rate  17   20 17:27


 


Blood Pressure  107/72   20 17:27


 


O2 Saturation  99   20 17:27














- Exam


Pt presents to triage with lower abdominal pain that has been dull for a week, 

but got worse today. She reports infrequent sharp pains. She denies sensation of

contractions, or a "coming and going" of this pain. She does report two weeks of

increased whitish-green smelly discharge consistent with previous BV infections 

in her past. She also reports feeling lightheaded and nauseas that she equates 

to be anemic. Denies HA, LOF, VB. Reports normal FM. 








- Procedures


OB Procedure Performed: NST (BD affirm and CBC collected, FHTs evaluated)


Findings: 


FHTs 140s, moderate variability, accels, no decels


CAT I


No uterine contractions








- Plan


Plan: 


BD affirm and CBC collected. Will telephone patient with abnormal results.


Discharged pt to home with  labor precautions and regular warning signs.

## 2020-05-12 NOTE — DELIVERY NOTE
Delivery Note





- Labor


Labor: positive: Spontaneous





- Infant Delivery Method


Infant Delivery Method: positive: Spontaneous vaginal delivery





- Birth Presentation


Birth Presentation: positive: Vertex, LOBO - left occiput anterior





- Nuchal Cord


Nuchal Cord: positive: None (Foot entanglement)





- Anesthetic


Anesthetic Type: 





- Amniotic Fluid Description


Amniotic Fluid Description: positive: Clear





- Episiotomy Type


Episiotomy Type: positive: None





- Laceration


Laceration: positive: 1st degree, Labial





- Delivery Outcome


Delivery Outcome: positive: Livebirth





- [place]


: positive: Placed in direct skin contact with mother, Bulb syringe, 

Stimulated


 sex: positive: Male (Apgars 9/9, weight 8 lb 12 oz)





- Cord


Cord: positive: 3 vessels





- Placenta


Placenta: positive: Intact, Spontaneous





- Estimated Blood Loss


Estimated Blood Loss (in cc): 175





- Post Delivery Events


Post Delivery Events: positive: No post delivery events





- Delivery Comments (Free Text/Narrative)


Delivery Comments (Free Text/Narrative): 


Patient developed contractions throughout the day.  However roughly 8:00 and 

became very strongly.  She presented to labor and delivery at before midnight.  

She was initially noted to have contractions and then spontaneously ruptured in 

labor and delivery at 0015.  For this reason she was admitted.  During labor she

developed contractions which became stronger with time she had an epidural 

placed for labor analgesia.  Her strip was category 1 however it developed a 

bradycardia down into the 90s.  This resolved with O2 as well as position change

to.  Her accelerations returned.  She reached complete at 0 322 and started to 

push at 0 344 she delivered a live male infant left occiput anterior.  At time 

of delivery there was a pause prior to delivering the anterior shoulder.  

However this responded to Enriqueta maneuvers.  The infant had Apgars of 9 and 9

weight 8 pounds 12 ounces there was a foot entanglement noted at time of 

delivery.  Baby was placed on maternal chest.  The cord was clamped after it 

stopped pulsating and then divided.  Placenta was delivered at 0 349 was 

inspected and noted to be complete.  She had minimal blood loss during her 

delivery and 175 cc a.  Inspecting the perineum there is no evidence of any 

lacerations there were some small periurethral or los-labial minora lacerations

which did not warrant sutures.  Both mother and infant tolerated delivery well.

## 2020-05-12 NOTE — ANESTHESIA
Pre-Anesthesia VS, & Labs





- Diagnosis





labor





- Procedure





labor epidural


Vital Signs: 





                                        











Temp Pulse Resp BP Pulse Ox


 


 36.7 C   87   18   137/77 H  97 


 


 05/12/20 01:00  05/11/20 23:17  05/11/20 23:17  05/11/20 23:27  05/11/20 23:17














                                        





Height                           5 ft 5 in


Weight (kg)                      108.862 kg


Body Mass Index                  36.5











- Pregnancy


Is Patient Pregnant?: Yes





- Lab Results


Current Lab Results: 





Laboratory Tests





05/12/20 00:44: WBC 16.8 H, RBC 4.74, Hgb 13.0, Hct 40.3, MCV 85.0, MCH 27.4, 

MCHC 32.3, RDW 15.9 H, Plt Count 257, MPV 10.8, Neut # (Auto) 11.9 H, Lymph # 

(Auto) 3.4, Mono # (Auto) 1.1 H, Eos # (Auto) 0.2, Baso # (Auto) 0.0, Absolute 

Nucleated RBC 0.00, Nucleated RBC % 0.0








Fish Bones: 


                                 05/12/20 00:44








Home Medications and Allergies





Active Medications





Ampicillin Sodium 1 gm/ Sodium (Chloride)  100 mls @ 200 mls/hr IV Q4H [facility]


Lactated Ringer's (Lr)  1,000 mls @ 150 mls/hr IV .[id number] [facility]


   Last Admin: 05/12/20 01:13 Dose:  150 mls/hr


Sodium Chloride (Normal Saline Flush 0.9%)  10 ml IVP PRN PRN


   PRN Reason: AS NEEDED PER PROVIDER ORDERS


Sodium Chloride (Normal Saline Flush 0.9%)  10 ml IVP 0100,0900,1700 [facility]


   Last Admin: 05/12/20 01:13 Dose:  10 ml








Allergies/Adverse Reactions: 


                                    Allergies











Allergy/AdvReac Type Severity Reaction Status Date / Time


 


Latex, Natural Rubber Allergy  Rash Verified 11/22/18 19:58














Anes History & Medical History





- Anesthetic History


Anesthesia Complications: reports: Other-see comment (difficulty placing first 

labor epidural per patient)


Family history of Anesthesia Complications: Denies


Family history of Malignant Hyperthermia: Denies





- Medical History


Cardiovascular: reports: None


Pulmonary: reports: None


Gastrointestinal: reports: GERD


Smoking Status: Never smoker


Psychosocial: reports: Depression





- Surgical History


General: Cholecystectomy





Exam


General: Alert, Oriented x3, Cooperative


Dental: WNL


Mouth Opening: Greater than 4 Fingerbreadths


Neck Mobility: Normal


Mallampati classification: II


Thyromental Distance: 4-6 cm


Respiratory: Lungs clear


Cardiovascular: Regular rate





Plan


Anesthesia Type: Epidural


Consent for Procedure(s) Verified and Reviewed: Yes


Code Status: Attempt Resuscitation


ASA classification: 2-Mild systemic disease


Is this case an emergency?: No

## 2020-05-12 NOTE — PREOP HISTORY & PHYSICAL
DATE OF SERVICE: 05/12/2020

Physician: Rodolfo Verdin MD

 

IDENTIFICATION:  Patient is a 23-year-old G5, P1 female whose EDC is May 11, 
making her 40 weeks and 1 day.

 

CHIEF COMPLAINT:  Spontaneous rupture of membranes.

 

HISTORY OF PRESENT ILLNESS:  Patient started her OB care at 8 weeks EGA, and has
been unremarkable throughout her pregnancy.  She is noted to have good growth.  
Her last exam, however, shows a fundal height of 42 cm.  Blood pressures have 
all been normal.  She had dating with early criteria, with ultrasound at 8 weeks
EGA.  Her labs showed her to be O positive.  She is rubella immune.  Her 50 gram
Glucola was 136.  However, her GBS cultures were positive.  Her STI checks were 
all negative.  Patient had contractions throughout the day on the 11th and 
presented to Labor and Delivery when they became strong at 10 o'clock.  She 
presented to labor and delivery and, at 0015, she spontaneously ruptured.  For 
this reason, she was admitted.

 

PAST MEDICAL HISTORY:  Positive for some depression.

 

PAST SURGICAL HISTORY:  Positive for a cholecystectomy.

 

SOCIAL HISTORY:  Patient is a nonsmoker.  She denies the use of tobacco, street 
or addictive drugs.  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

 

PHYSICAL EXAMINATION

GENERAL:  Well-developed, well-nourished female, in no acute distress at this 
time.

HEENT:  Pupils are equal and round.  Extraocular muscles are intact.  Thyroid is
not enlarged.

HEART:  Regular rate and rhythm without murmurs.

LUNGS:  Lung fields are clear without rales or wheezes.

ABDOMEN:  Soft with a fundal mass, compatible with her pregnancy.

PELVIC:   Upon my arrival, her cervical examination showed her to be complete, 
+2.

 

IMPRESSION

1.  A 23-year-old G5, P1 female who is 40 weeks and 1 day.

2.  Active labor.

3.  Spontaneous rupture of membranes.

 

PLAN:  We will deliver patient.

 

 

DD: 05/12/2020 04:16

TD: 05/12/2020 04:17

Job #: [id number]

[facility]

## 2020-05-13 NOTE — DISCHARGE SUMMARY
Physician: Rodolfo Verdin MD

DATE OF ADMISSION: 2020

DATE OF DISCHARGE:  2020

 

ADMITTING DIAGNOSES  

1.  A 23-year-old G5, P1 female, 40 weeks 1 day.

2.  Spontaneous rupture of membranes.

3.  Group B strep positive.

 

DISCHARGE DIAGNOSES

1.  A 23-year-old G5, P1 female, 40 weeks 1 day.

2.  Spontaneous rupture of membranes.

3.  Group B strep positive.

4.  Spontaneous vaginal delivery.

 

PRESENTING HISTORY:  Patient is a 23-year-old  5, para 1 female who was 
40 weeks and 1 day, due date was 11, May.  She started OB care at 8 weeks EGA, 
had an unremarkable  course.  She was noted to have a large fundal 
height.  Her labs showed her to be O positive, rubella immune, 50 grams Glucola 
was 136.  She was GBS positive.

 

On admission, her cervix was noted to be complete and +2.  She had presented 
earlier to labor and delivery, at which time she was noted to be ruptured and 
she was started on ampicillin secondary to her group B strep positive labs.

 

LABORATORIES:  CBC on admission showed a white count of 16.8, hemoglobin was 
13.0, hematocrit was 40.3, platelets were 257.  



HOSPITAL COURSE:  Patient was allowed to labor.  She received an epidural for 
labor analgesia.  She had a category 1 strip, however, she did develop some 
bradycardia down to the 90s.  She had a 22-minute second stage, delivered the 
baby left occiput anterior.  At time of delivery, she had a mild shoulder 
dystocia, which responded to Enriqueta delivery maneuvers.  She suffered small 
labial lacerations, which were not repaired.  The patient today complains of 
pain of 0 out of 10.  She is currently managing her pain on Tylenol.  She is 
breastfeeding at this time.  We have discussed contraception.

 

DISCHARGE MEDICATIONS:  Home supply of Tylenol as well as Motrin. 

 

She is instructed to follow up in the clinic in 1 week.  We discussed pelvic 
rest as well as the advantages of breastfeeding.  The baby will probably need to
stay 48 hours as it only received one dose of ampicillin.  We will allow her to 
room in.

 

 

DD: 2020 11:22

TD: 2020 11:32

Job #: [id number]

[facility]

## 2020-05-14 NOTE — LABOR FLOWSHEET
===================================

Labor Flowsheet

===================================

Datetime Report Generated by CPN: 05/14/2020 13:41

   

   

===========================

Datetime: 05/12/2020 07:57

===========================

   

   

===================================

VITAL SIGNS

===================================

   

 NBP Sys/Ruthy/Mean (mmHg):  121

:  61

:  75

 Pulse:  80

   

===========================

Datetime: 05/12/2020 03:55

===========================

   

 SpO2 (%):  100

   

===========================

Datetime: 05/12/2020 03:50

===========================

   

Stage of Pregnancy:  Recovery

   

===========================

Datetime: 05/12/2020 03:40

===========================

   

 LaborFlag:  Labor

   

===========================

Datetime: 05/12/2020 03:31

===========================

   

   

===================================

STAGE 2

===================================

   

 Pushing:  Coached on Pushing

 Pushing Position:  Pushing with Contractions

 Pushing Progress:  Descent with Pushing

   

===========================

Datetime: 05/12/2020 03:21

===========================

   

   

===================================

VAGINAL EXAM

===================================

   

 Dilatation (cm):  10.0

 Effacement (%):  100

 Station:  2

 Exam by:  Dr. Giem

 Vaginal Bleeding:  None

 Cervix, Position:  Anterior

   

===========================

Datetime: 05/12/2020 03:12

===========================

   

 I/O Interventions:  Sarmiento Cath Inserted

   

===========================

Datetime: 05/12/2020 02:59

===========================

   

   

===================================

UTERINE ACTIVITY

===================================

   

 Monitor Mode:  External

 Frequency (min):  2-3

 Quality:  Moderate

 Duration (sec):  50-80

 Pattern:  Normal: <= 5 Contractions in 10 Minutes

 Resting Tone (Palpate):  Relaxed

   

===================================

FETAL ASSESSMENT A

===================================

   

 Monitor Mode:  External US

 FHR Baseline Rate :  125

 Variability:  Moderate 6-25 bpm

 Accelerations:  15X15

 Decelerations:  None (Annotations: variable)

 Category:  Category II

   

===========================

Datetime: 05/12/2020 02:58

===========================

   

 Communication Comments:  provider called, is en route.

   

===========================

Datetime: 05/12/2020 02:50

===========================

   

 Actions for Fetal Decelerations:  IV Bolus

 Comments:  recovering to baseline, provider en route

   

===========================

Datetime: 05/12/2020 02:47

===========================

   

 Patient Position/Activity:  Left Lateral

   

===================================

COMMUNICATION

===================================

   

 Communication:  RN at Bedside; Call/Page Placed to Provider

 Provider Notified (Name):  Dr. Verdin

   

===========================

Datetime: 05/12/2020 02:12

===========================

   

 Epidural Procedure Other:  Pump Started

   

===========================

Datetime: 05/12/2020 01:52

===========================

   

 Epidural Procedure:  Cath Placed; Test Dose

   

===========================

Datetime: 05/12/2020 01:48

===========================

   

   

===================================

ANESTHESIA

===================================

   

 Anesthesia Plans:  Local

 Anesthesia Comments:  local placed

   

===========================

Datetime: 05/12/2020 01:30

===========================

   

 Contraction Comments:  pt active poor pick-up

   

===========================

Datetime: 05/12/2020 00:44

===========================

   

 Respirations:  18

 Temperature (C):  36.7

 Temperature Route:  Oral

   

===================================

PATIENT CARE

===================================

   

 IV/Blood Work:  IV Started